# Patient Record
Sex: MALE | Race: WHITE | Employment: UNEMPLOYED | URBAN - METROPOLITAN AREA
[De-identification: names, ages, dates, MRNs, and addresses within clinical notes are randomized per-mention and may not be internally consistent; named-entity substitution may affect disease eponyms.]

---

## 2017-08-08 ENCOUNTER — OFFICE VISIT (OUTPATIENT)
Dept: FAMILY MEDICINE | Facility: CLINIC | Age: 27
End: 2017-08-08
Payer: COMMERCIAL

## 2017-08-08 VITALS
SYSTOLIC BLOOD PRESSURE: 118 MMHG | WEIGHT: 209 LBS | HEART RATE: 60 BPM | BODY MASS INDEX: 28.31 KG/M2 | TEMPERATURE: 97.2 F | HEIGHT: 72 IN | DIASTOLIC BLOOD PRESSURE: 60 MMHG

## 2017-08-08 DIAGNOSIS — Z02.83 ENCOUNTER FOR DRUG SCREENING: Primary | ICD-10-CM

## 2017-08-08 DIAGNOSIS — F32.A DEPRESSION, UNSPECIFIED DEPRESSION TYPE: ICD-10-CM

## 2017-08-08 DIAGNOSIS — F19.11: ICD-10-CM

## 2017-08-08 DIAGNOSIS — G89.29 CHRONIC THORACIC BACK PAIN, UNSPECIFIED BACK PAIN LATERALITY: ICD-10-CM

## 2017-08-08 DIAGNOSIS — F41.1 GENERALIZED ANXIETY DISORDER: ICD-10-CM

## 2017-08-08 DIAGNOSIS — M54.6 CHRONIC THORACIC BACK PAIN, UNSPECIFIED BACK PAIN LATERALITY: ICD-10-CM

## 2017-08-08 DIAGNOSIS — G47.9 SLEEP DISORDER: ICD-10-CM

## 2017-08-08 PROBLEM — F19.10 MULTIPLE SUBSTANCE ABUSE (H): Status: ACTIVE | Noted: 2017-08-08

## 2017-08-08 PROCEDURE — 99000 SPECIMEN HANDLING OFFICE-LAB: CPT | Performed by: NURSE PRACTITIONER

## 2017-08-08 PROCEDURE — 80307 DRUG TEST PRSMV CHEM ANLYZR: CPT | Mod: 90 | Performed by: NURSE PRACTITIONER

## 2017-08-08 PROCEDURE — 99214 OFFICE O/P EST MOD 30 MIN: CPT | Performed by: NURSE PRACTITIONER

## 2017-08-08 RX ORDER — IBUPROFEN 800 MG/1
800 TABLET, FILM COATED ORAL EVERY 8 HOURS PRN
Qty: 90 TABLET | Refills: 1 | Status: SHIPPED | OUTPATIENT
Start: 2017-08-08

## 2017-08-08 RX ORDER — DULOXETIN HYDROCHLORIDE 60 MG/1
60 CAPSULE, DELAYED RELEASE ORAL
COMMUNITY
End: 2017-08-30

## 2017-08-08 RX ORDER — ALPRAZOLAM 2 MG
2 TABLET ORAL 3 TIMES DAILY PRN
Qty: 42 TABLET | Refills: 0 | Status: SHIPPED | OUTPATIENT
Start: 2017-08-08 | End: 2017-08-30

## 2017-08-08 RX ORDER — DULOXETIN HYDROCHLORIDE 60 MG/1
60 CAPSULE, DELAYED RELEASE ORAL DAILY
Qty: 30 CAPSULE | Refills: 1 | Status: SHIPPED | OUTPATIENT
Start: 2017-08-08

## 2017-08-08 RX ORDER — GABAPENTIN 600 MG/1
TABLET ORAL
Qty: 84 TABLET | Refills: 0 | Status: SHIPPED | OUTPATIENT
Start: 2017-08-08 | End: 2017-08-30

## 2017-08-08 RX ORDER — ACETAMINOPHEN 500 MG
1000 TABLET ORAL 3 TIMES DAILY PRN
Qty: 90 TABLET | Refills: 1 | Status: SHIPPED | OUTPATIENT
Start: 2017-08-08

## 2017-08-08 RX ORDER — CLONIDINE HYDROCHLORIDE 0.1 MG/1
0.1 TABLET ORAL
COMMUNITY
End: 2017-08-08

## 2017-08-08 RX ORDER — ALPRAZOLAM 2 MG
2 TABLET ORAL
COMMUNITY
End: 2017-08-08

## 2017-08-08 RX ORDER — CLONIDINE HYDROCHLORIDE 0.1 MG/1
0.1 TABLET ORAL AT BEDTIME
Qty: 30 TABLET | Refills: 0 | Status: SHIPPED | OUTPATIENT
Start: 2017-08-08

## 2017-08-08 ASSESSMENT — PATIENT HEALTH QUESTIONNAIRE - PHQ9: 5. POOR APPETITE OR OVEREATING: SEVERAL DAYS

## 2017-08-08 ASSESSMENT — ANXIETY QUESTIONNAIRES
GAD7 TOTAL SCORE: 8
7. FEELING AFRAID AS IF SOMETHING AWFUL MIGHT HAPPEN: NOT AT ALL
1. FEELING NERVOUS, ANXIOUS, OR ON EDGE: MORE THAN HALF THE DAYS
6. BECOMING EASILY ANNOYED OR IRRITABLE: SEVERAL DAYS
2. NOT BEING ABLE TO STOP OR CONTROL WORRYING: SEVERAL DAYS
5. BEING SO RESTLESS THAT IT IS HARD TO SIT STILL: SEVERAL DAYS
3. WORRYING TOO MUCH ABOUT DIFFERENT THINGS: MORE THAN HALF THE DAYS
IF YOU CHECKED OFF ANY PROBLEMS ON THIS QUESTIONNAIRE, HOW DIFFICULT HAVE THESE PROBLEMS MADE IT FOR YOU TO DO YOUR WORK, TAKE CARE OF THINGS AT HOME, OR GET ALONG WITH OTHER PEOPLE: VERY DIFFICULT

## 2017-08-08 NOTE — NURSING NOTE
"Chief Complaint   Patient presents with     Back Pain       Initial There were no vitals taken for this visit. Estimated body mass index is 32.08 kg/(m^2) as calculated from the following:    Height as of 7/26/16: 6' 2.02\" (1.88 m).    Weight as of 7/26/16: 250 lb (113.4 kg).  Medication Reconciliation: complete  "

## 2017-08-08 NOTE — MR AVS SNAPSHOT
After Visit Summary   8/8/2017    Pan Brewster    MRN: [id number]           Patient Information     Date Of Birth          1990        Visit Information        Provider Department      8/8/2017 1:30 PM Barbara Chilel APRN CNP [facility]        Today's Diagnoses     Encounter for drug screening    -  1    Depression, unspecified depression type        Generalized anxiety disorder        Chronic thoracic back pain, unspecified back pain laterality        Sleep disorder        Hx of mixed drug abuse           Follow-ups after your visit        Additional Services     MENTAL HEALTH REFERRAL       Your provider has referred you to: [facility]: [facility] - Counseling (Individual/Couples/Family) - [facility] [phone]   [email or web address]   *Please call to schedule an appointment.    All scheduling is subject to the client's specific insurance plan & benefits, provider/location availability, and provider clinical specialities.  Please arrive 15 minutes early for your first appointment and bring your completed paperwork.    Please be aware that coverage of these services is subject to the terms and limitations of your health insurance plan.  Call member services at your health plan with any benefit or coverage questions.                  Who to contact     If you have questions or need follow up information about today's clinic visit or your schedule please contact [facility] directly at [phone].  Normal or non-critical lab and imaging results will be communicated to you by MyChart, letter or phone within 4 business days after the clinic has received the results. If you do not hear from us within 7 days, please contact the clinic through MyChart or phone. If you have a critical or abnormal lab result, we will notify you by phone as soon as possible.  Submit refill requests  "through [employer]eeWhere or call your pharmacy and they will forward the refill request to us. Please allow 3 business days for your refill to be completed.          Additional Information About Your Visit        [employer] Information     [employer]Where lets you send messages to your doctor, view your test results, renew your prescriptions, schedule appointments and more. To sign up, go to [email or web address] . Click on \"Log in\" on the left side of the screen, which will take you to the Welcome page. Then click on \"Sign up Now\" on the right side of the page.     You will be asked to enter the access code listed below, as well as some personal information. Please follow the directions to create your username and password.     Your access code is: [email or web address]  Expires: 2017  5:22 PM     Your access code will  in 90 days. If you need help or a new code, please call your [facility] clinic or [phone].        Care EveryWhere ID     This is your Care EveryWhere ID. This could be used by other organizations to access your [facility] medical records  [id number]        Your Vitals Were     Pulse Temperature Height BMI (Body Mass Index)          60 97.2  F (36.2  C) (Tympanic) 6' 0.1\" (1.831 m) 28.27 kg/m2         Blood Pressure from Last 3 Encounters:   17 118/60   16 122/84   09/30/15 (!) 150/94    Weight from Last 3 Encounters:   17 209 lb (94.8 kg)   16 250 lb (113.4 kg)   09/27/15 262 lb (118.8 kg)              We Performed the Following     Drug  Screen Comprehensive , Urine with Reported Meds (MedTox) (Pain Care Package)     MENTAL HEALTH REFERRAL          Today's Medication Changes          These changes are accurate as of: 17  5:22 PM.  If you have any questions, ask your nurse or doctor.               Start taking these medicines.        Dose/Directions    gabapentin 600 MG tablet   Commonly known as:  NEURONTIN   Used for:  Chronic thoracic back pain, unspecified back pain laterality "   Replaces:  gabapentin 400 MG capsule   Started by:  Barbara Chilel APRN CNP        Take 2 tablets 3 times daily   Quantity:  84 tablet   Refills:  0         These medicines have changed or have updated prescriptions.        Dose/Directions    acetaminophen 500 MG tablet   Commonly known as:  TYLENOL   This may have changed:    - when to take this  - reasons to take this   Used for:  Chronic thoracic back pain, unspecified back pain laterality   Changed by:  Barbara Chilel APRN CNP        Dose:  1000 mg   Take 2 tablets (1,000 mg) by mouth 3 times daily as needed for mild pain   Quantity:  90 tablet   Refills:  1       ALPRAZolam 2 MG tablet   Commonly known as:  XANAX   This may have changed:    - when to take this  - reasons to take this   Used for:  Generalized anxiety disorder   Changed by:  Barbara Chilel APRN CNP        Dose:  2 mg   Take 1 tablet (2 mg) by mouth 3 times daily as needed for sleep 1 tablet 3 times daily   Quantity:  42 tablet   Refills:  0       cloNIDine 0.1 MG tablet   Commonly known as:  CATAPRES   This may have changed:  when to take this   Used for:  Sleep disorder   Changed by:  Barbara Chilel APRN CNP        Dose:  0.1 mg   Take 1 tablet (0.1 mg) by mouth At Bedtime 1 tablet at bedtime   Quantity:  30 tablet   Refills:  0       * IBUPROFEN PO   This may have changed:  Another medication with the same name was added. Make sure you understand how and when to take each.        Dose:  800 mg   Take 800 mg by mouth every 8 hours as needed for moderate pain   Refills:  0       * ibuprofen 800 MG tablet   Commonly known as:  ADVIL/MOTRIN   This may have changed:  You were already taking a medication with the same name, and this prescription was added. Make sure you understand how and when to take each.   Used for:  Chronic thoracic back pain, unspecified back pain laterality   Changed by:  Barbara Chilel APRN CNP        Dose:  800 mg   Take 1 tablet (800 mg) by mouth  every 8 hours as needed for moderate pain   Quantity:  90 tablet   Refills:  1       * Notice:  This list has 2 medication(s) that are the same as other medications prescribed for you. Read the directions carefully, and ask your doctor or other care provider to review them with you.      Stop taking these medicines if you haven't already. Please contact your care team if you have questions.     gabapentin 400 MG capsule   Commonly known as:  NEURONTIN   Replaced by:  gabapentin 600 MG tablet   Stopped by:  Barbara Chilel APRN CNP                Where to get your medicines      These medications were sent to [facility], [place] - [address]   [address] [facility] [address]     Phone:  [phone]     acetaminophen 500 MG tablet    cloNIDine 0.1 MG tablet    DULoxetine 60 MG EC capsule    gabapentin 600 MG tablet    ibuprofen 800 MG tablet         Some of these will need a paper prescription and others can be bought over the counter.  Ask your nurse if you have questions.     Bring a paper prescription for each of these medications     ALPRAZolam 2 MG tablet                Primary Care Provider Office Phone # Fax #    BEREKET Pleitez [phone]       [address]   [facility] [address]        Equal Access to Services     [facility]: Hadii rick ku hadasho Soomaali, waaxda luqadaha, qaybta kaalmada adeegyada, pierre juárez . So [facility] [phone].    ATENCIÓN: Si habla español, tiene a palmer disposición servicios gratuitos de asistencia lingüística. Llame al [phone].    We comply with applicable federal civil rights laws and [place] laws. We do not discriminate on the basis of race, color, national origin, age, disability sex, sexual orientation or gender identity.            Thank you!     Thank you for choosing [facility]  for your care. Our goal is always to provide you with excellent care. Hearing  back from our patients is one way we can continue to improve our services. Please take a few minutes to complete the written survey that you may receive in the mail after your visit with us. Thank you!             Your Updated Medication List - Protect others around you: Learn how to safely use, store and throw away your medicines at www.disposemymeds.org.          This list is accurate as of: 8/8/17  5:22 PM.  Always use your most recent med list.                   Brand Name Dispense Instructions for use Diagnosis    acetaminophen 500 MG tablet    TYLENOL    90 tablet    Take 2 tablets (1,000 mg) by mouth 3 times daily as needed for mild pain    Chronic thoracic back pain, unspecified back pain laterality       albuterol 108 (90 BASE) MCG/ACT Inhaler    PROAIR HFA/PROVENTIL HFA/VENTOLIN HFA    1 Inhaler    Inhale 2 puffs into the lungs every 6 hours    Bronchitis       ALPRAZolam 2 MG tablet    XANAX    42 tablet    Take 1 tablet (2 mg) by mouth 3 times daily as needed for sleep 1 tablet 3 times daily    Generalized anxiety disorder       cloNIDine 0.1 MG tablet    CATAPRES    30 tablet    Take 1 tablet (0.1 mg) by mouth At Bedtime 1 tablet at bedtime    Sleep disorder       * DULoxetine 60 MG EC capsule    CYMBALTA     Take 60 mg by mouth 1 tablet daily    Depression, unspecified depression type       * DULoxetine 60 MG EC capsule    CYMBALTA    30 capsule    Take 1 capsule (60 mg) by mouth daily    Depression, unspecified depression type       gabapentin 600 MG tablet    NEURONTIN    84 tablet    Take 2 tablets 3 times daily    Chronic thoracic back pain, unspecified back pain laterality       * IBUPROFEN PO      Take 800 mg by mouth every 8 hours as needed for moderate pain        * ibuprofen 800 MG tablet    ADVIL/MOTRIN    90 tablet    Take 1 tablet (800 mg) by mouth every 8 hours as needed for moderate pain    Chronic thoracic back pain, unspecified back pain laterality       lidocaine 5 % Patch    LIDODERM     90 patch    Place 3 patches onto the skin every 24 hours    Chronic low back pain, Acute exacerbation of chronic low back pain       * Notice:  This list has 4 medication(s) that are the same as other medications prescribed for you. Read the directions carefully, and ask your doctor or other care provider to review them with you.

## 2017-08-08 NOTE — PROGRESS NOTES
SUBJECTIVE:                                                    Pan Brewster is a 27 year old male who presents to clinic today for the following health issues:      Back Pain       Duration: ongoing        Specific cause: chronic    Description:   Location of pain:upper back low back right in the middle  Character of pain: stabbing  Pain radiation:to under arms  New numbness or weakness in legs, not attributed to pain:  no     Intensity: Currently 7/10    History:   Pain interferes with job: YES,   History of back problems:previous back issues  Any previous MRI or X-rays: Yes- at [facility].  Date 2015-16  Sees a specialist for back pain:  No  Therapies tried without relief: none    Alleviating factors:   Improved by: heat stretches    Precipitating factors:  Worsened by: Bending    Functional and Psychosocial Screen (Gautam STarT Back):      Not performed today          Accompanying Signs & Symptoms:  Risk of Fracture:  None  Risk of Cauda Equina:  None  Risk of Infection:  None  Risk of Cancer:  None  Risk of Ankylosing Spondylitis:  Onset at age <35, male, AND morning back stiffness. no         The patient has a history of chronic back pain. He reports chronic pain due to a fracture of T8. I am familiar with this patient, [facility] several times about 2 years ago. It was determined the fracture at that time was old, and many of his stories about his treatment and workup that other facilities weren't exactly true. He's had CTs and MRIs, which have otherwise been negative. He does have a history of polysubstance abuse and depression. He had failed a drug contract with myself, and was seeking medical care elsewhere. At one point in time he was admitted to [facility] with suicidal ideation and drug screen positive for multiple drugs. He has been living in [place], was getting his medical treatment at a clinic in [place]. I'm uncertain as to whether or not this is a medical doctor or a psychiatrist.  He has not been getting any opioids, this was last prescribed January 3, following hernia surgery that was performed in [place] I have reviewed the [place] prescription monitoring program, and he has been getting prescriptions for gabapentin and alprazolam on a regular basis from the same clinic providers through [facility]. At one point he was tried on clonazepam rather than alprazolam, but apparently that didn't work and he is back on alprazolam  He states he has moved back to [place], and would like to reestablish care in this clinic. He reports having great difficulty managing his anxiety, stating he has been out of his medications for a month. According to the prescription monitoring program, he was last prescribed alprazolam 21 tablets on 6/26, and gabapentin 600 mg tablets, 180 of those on 7/3.    He takes clonidine 0.1 mg at h.s. for sleep  He takes Cymbalta 60 mg daily for depression  Gabapentin is 1200 mg t.i.d. for management of chronic pain and anxiety  He was using Xanax 1 tablet 3 times daily    He admits to smoking pot this past month to manage his anxiety, but denies use of other illegal drugs. He is taking ibuprofen 800 mg 2 or 3 times daily, and also takes Tylenol in between that time for management of his back pain. He states he is doing exercises including stretching and pushups, and walks between 2-5 miles daily.  He has lost approximately 70 pounds since he was last seen in this clinic    Problem list and histories reviewed & adjusted, as indicated.  Additional history: as documented    BP Readings from Last 3 Encounters:   08/08/17 118/60   07/26/16 122/84   09/30/15 (!) 150/94    Wt Readings from Last 3 Encounters:   08/08/17 209 lb (94.8 kg)   07/26/16 250 lb (113.4 kg)   09/27/15 262 lb (118.8 kg)                      Reviewed and updated as needed this visit by clinical staffTobacco  Allergies  Meds  Med Hx  Surg Hx  Fam Hx  Soc Hx      Reviewed and updated as needed this  "visit by Provider         ROS:  Constitutional, HEENT, cardiovascular, pulmonary, gi and gu systems are negative, except as otherwise noted.      OBJECTIVE:   /60  Pulse 60  Temp 97.2  F (36.2  C) (Tympanic)  Ht 6' 0.1\" (1.831 m)  Wt 209 lb (94.8 kg)  BMI 28.27 kg/m2  Body mass index is 28.27 kg/(m^2).   GENERAL: Nutrition and hydration status appear normal  EYES: Eyes grossly normal to inspection, PERRL and conjunctivae and sclerae normal  NECK: no adenopathy, no asymmetry, masses, or scars and thyroid normal to palpation  RESP: lungs clear to auscultation - no rales, rhonchi or wheezes  CV: regular rate and rhythm, normal S1 S2, no S3 or S4, no murmur, click or rub, no peripheral edema and peripheral pulses strong  ABDOMEN: soft, nontender, no hepatosplenomegaly, no masses and bowel sounds normal  MS: no gross musculoskeletal defects noted, no edema  SKIN: no suspicious lesions or rashes  NEURO: Normal strength and tone, mentation intact and speech normal  PSYCH: mentation appears normal, affect somewhat anxious        ASSESSMENT/PLAN:     Problem List Items Addressed This Visit        Medium    Sleep disorder    Relevant Medications    cloNIDine (CATAPRES) 0.1 MG tablet    Generalized anxiety disorder    Relevant Medications    ALPRAZolam (XANAX) 2 MG tablet    Other Relevant Orders    MENTAL HEALTH REFERRAL    Depression, unspecified depression type    Relevant Medications    DULoxetine (CYMBALTA) 60 MG EC capsule    ALPRAZolam (XANAX) 2 MG tablet    DULoxetine (CYMBALTA) 60 MG EC capsule    Other Relevant Orders    MENTAL HEALTH REFERRAL    Chronic thoracic back pain, unspecified back pain laterality    Relevant Medications    acetaminophen (TYLENOL) 500 MG tablet    gabapentin (NEURONTIN) 600 MG tablet    ibuprofen (ADVIL/MOTRIN) 800 MG tablet      Other Visit Diagnoses     Encounter for drug screening    -  Primary    Relevant Orders    Drug  Screen Comprehensive , Urine with Reported Meds (MedTox) " (Pain Care Package) (Completed)    Hx of mixed drug abuse        Relevant Orders    MENTAL HEALTH REFERRAL           We discussed my concern prescribing controlled substances to him, in light of the fact he failed to contract with me in the past. He has not had any prescribed opioids for several months. He does admit to smoking pot. He states he has not had any of his prescription medications for the past month. A drug screen is collected today. He was given 2 week refill of his prescriptions to last until results of the drug screen are back. We reviewed the controlled substance agreement and he signed it. If there is anything other than marijuana on the drug screen, the contract will be terminated. If that is all that is showing, since he did admit to it, will give him the benefit of the doubt and refill prescriptions one month at a time. He will be required to follow-up monthly and at some point will be randomly requested to provide a sample for a urine drug screen. He verbally agreed to this plan. Referral made to counseling    This is a 30 minute appointment of which greater than 50% of time was spent in counseling and developing a plan of care    BEREKET Pleitez [facility]

## 2017-08-08 NOTE — LETTER
[facility]    08/08/17    Patient: Pan Brewster  YOB: 1990  Medical Record Number: [id number]                                                                  Controlled Substance Agreement  I understand that my care provider has prescribed controlled substances (narcotics, tranquilizers, and/or stimulants) to help manage my condition(s).  I am taking this medicine to help me function or work.  I know that this is strong medicine.  It could have serious side effects and even cause a dependency on the drug.  If I stop these medicines suddenly, I could have severe withdrawal symptoms.    The risks, benefits, and side effects of these medication(s) were explained to me.  I agree that:  1. I will take part in other treatments as advised by my provider.  This may be psychiatry or counseling, physical therapy, behavioral therapy, group treatment, or a referral to a pain clinic.  I will reduce or stop my medicine when my provider tells me to do so.   2. I will take my medicines as prescribed.  I will not change the dose or schedule unless my provider tells me to.  There will be no refills if I  run out early.   I may be contacted at any time without warning and asked to complete a drug test or pill count.   3. I will keep all my appointments at the clinic.  If I miss appointments or fail to follow instructions, my provider may stop my medicine.  4. I will not ask other providers to prescribe controlled substances. And I will not accept controlled substances from other people. If I need another prescribed controlled substance for a new reason, I will notify my provider within one business day.  5. If I enroll in the [place] Medical Marijuana program, I will tell my provider.  I will also sign an agreement to share my medical records with my provider.  6. I will use one pharmacy to fill all of my controlled substance prescriptions.  If my prescription is mailed to my  pharmacy, it may take 5 to 7 days for my medicine to be ready.  7. I understand that my provider, clinic care team, and pharmacy can track controlled substance prescriptions from other providers through a central database (prescription monitoring program).  8. I will bring in my list of medications (or my medicine bottles) each time I come to the clinic.  REV-  04/2016                                                                                                                                            Page 1 of 2      [facility]    08/08/17    Patient: Pan Brewster  YOB: 1990  Medical Record Number: [id number]    9. Refills of controlled substances will be made only during office hours.  It is up to me to make sure that I do not run out of my medicines on weekends or holidays.    10. I am responsible for my prescriptions.  If the medicine is lost or stolen, it will not be replaced.   I also agree not to share these medicines with anyone.  11. I agree to not use ANY illegal or recreational drugs.  This includes marijuana, cocaine, bath salts or other drugs.  I agree not to use alcohol unless my provider says I may.  I agree to give urine samples whenever asked.  If I fail to give a urine sample, the provider may stop my medicine.     12. I will tell my nurse or provider right away if I become pregnant or have a new medical problem treated outside of [facility]  13. I understand that this medicine can affect my thinking and judgment.  It may be unsafe for me to drive, use machinery and do dangerous tasks.  I will not do any of these things until I know how the medicine affects me.  If my dose changes, I will wait to see how it affects me.  I will contact my provider if I have concerns about medicine side effects.  I understand that if I do not follow any of the conditions above, my prescriptions or treatment may be stopped.    I agree that my provider, clinic  care team, and pharmacy may work with any city, state or federal law enforcement agency that investigates the misuse, sale, or other diversion of my controlled medicine. I will allow my provider to discuss my care with or share a copy of this agreement with any other treating provider, pharmacy or emergency room where I receive care.  I agree to give up (waive) any right of privacy or confidentiality with respect to these authorizations.   I have read this agreement and have asked questions about anything I did not understand.   ___________________________________    ___________________________  Patient Signature                                                           Date and Time  ___________________________________     ____________________________  Witness                                                                            Date and Time  ___________________________________  BEREKET Pleitez CNP  REV-  04/2016                                                                                                                                                                 Page 2 of 2

## 2017-08-09 ASSESSMENT — ANXIETY QUESTIONNAIRES: GAD7 TOTAL SCORE: 8

## 2017-08-15 LAB — PAIN DRUG SCR UR W RPTD MEDS: NORMAL

## 2017-08-17 PROBLEM — Z76.5 DRUG-SEEKING BEHAVIOR: Status: ACTIVE | Noted: 2017-08-17

## 2017-08-17 PROBLEM — Z91.148 CONTROLLED SUBSTANCE AGREEMENT TERMINATED: Status: ACTIVE | Noted: 2017-08-17

## 2017-08-21 ENCOUNTER — TELEPHONE (OUTPATIENT)
Dept: FAMILY MEDICINE | Facility: CLINIC | Age: 27
End: 2017-08-21

## 2017-08-21 DIAGNOSIS — F41.1 GENERALIZED ANXIETY DISORDER: ICD-10-CM

## 2017-08-21 DIAGNOSIS — G89.29 CHRONIC THORACIC BACK PAIN, UNSPECIFIED BACK PAIN LATERALITY: ICD-10-CM

## 2017-08-21 DIAGNOSIS — M54.6 CHRONIC THORACIC BACK PAIN, UNSPECIFIED BACK PAIN LATERALITY: ICD-10-CM

## 2017-08-21 RX ORDER — ALPRAZOLAM 2 MG
2 TABLET ORAL 3 TIMES DAILY PRN
Qty: 42 TABLET | Refills: 0 | Status: CANCELLED | OUTPATIENT
Start: 2017-08-21

## 2017-08-21 NOTE — TELEPHONE ENCOUNTER
Gabapentin       Last Written Prescription Date:  8/8/2017  Last Fill Quantity: 84,   # refills: 0  Last Office Visit with [facility] or [facility] prescribing provider: 8/8/2017  Future Office visit:    Next 5 appointments (look out 90 days)     Sep 01, 2017  1:30 PM CDT   Return Visit with RC Johnston   [facility] ([facility])    [address] [place] [id number] [phone]                   Routing refill request to provider for review/approval because:  Drug not on the [facility], [facility] or [facility] refill protocol or controlled substance

## 2017-08-21 NOTE — TELEPHONE ENCOUNTER
alprazolam      Last Written Prescription Date: 08/08/2017  Last Fill Quantity: 42,  # refills: 0   Last Office Visit with [facility], [facility] or  [facility] prescribing provider: 08/08/2017                                         Next 5 appointments (look out 90 days)     Sep 01, 2017  1:30 PM CDT   Return Visit with RC Johnston   [facility] ([facility])    [address] [place] [id number] [phone]                  Thank You,  Ryan Pantoja, Pharmacy [facility]

## 2017-08-24 NOTE — TELEPHONE ENCOUNTER
Pt failed drug test. He was sent a letter informing him I will not prescribe controlled substances for him. He was also informed I will not prescribe neurontin since this medication is frequently abused

## 2017-08-30 RX ORDER — GABAPENTIN 600 MG/1
TABLET ORAL
Qty: 84 TABLET | Refills: 0 | OUTPATIENT
Start: 2017-08-30

## 2018-03-02 ENCOUNTER — TELEPHONE (OUTPATIENT)
Dept: FAMILY MEDICINE | Facility: CLINIC | Age: 28
End: 2018-03-02

## 2018-03-02 NOTE — TELEPHONE ENCOUNTER
"Patient is on Dr. Ha's schedule today to [facility]. Per Dr. Ha patient was called to discover  what topics we would be seeing him for. Patient was looking for back pain and anxiety medications. Notified patient that Dr. Ha will not be refilling any controlled substances due to his failed UDS with [facility]. 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 Patient hung up the phone. Caller not sure if patient will be coming to the appointment or not.   "

## 2021-06-08 ENCOUNTER — RECORDS - HEALTHEAST (OUTPATIENT)
Dept: SCHEDULING | Facility: CLINIC | Age: 31
End: 2021-06-08

## 2021-06-08 ENCOUNTER — RECORDS - HEALTHEAST (OUTPATIENT)
Dept: ADMINISTRATIVE | Facility: OTHER | Age: 31
End: 2021-06-08

## 2021-06-08 DIAGNOSIS — M54.6 PAIN IN THORACIC SPINE: ICD-10-CM
